# Patient Record
Sex: MALE | Race: WHITE | URBAN - METROPOLITAN AREA
[De-identification: names, ages, dates, MRNs, and addresses within clinical notes are randomized per-mention and may not be internally consistent; named-entity substitution may affect disease eponyms.]

---

## 2019-01-09 ENCOUNTER — HOSPITAL ENCOUNTER (OUTPATIENT)
Dept: MEDSURG UNIT | Facility: HOSPITAL | Age: 59
End: 2019-01-10
Attending: INTERNAL MEDICINE | Admitting: INTERNAL MEDICINE

## 2019-01-09 LAB
ABS NEUT (OLG): 6.99 X10(3)/MCL (ref 2.1–9.2)
ALBUMIN SERPL-MCNC: 4.4 GM/DL (ref 3.4–5)
ALBUMIN/GLOB SERPL: 1 RATIO (ref 1–2)
ALP SERPL-CCNC: 51 UNIT/L (ref 45–117)
ALT SERPL-CCNC: 48 UNIT/L (ref 12–78)
AMYLASE SERPL-CCNC: 57 UNIT/L (ref 25–115)
APTT PPP: 27.8 SECOND(S) (ref 23.3–37)
AST SERPL-CCNC: 71 UNIT/L (ref 15–37)
BASOPHILS # BLD AUTO: 0.04 X10(3)/MCL
BASOPHILS NFR BLD AUTO: 0 %
BILIRUB SERPL-MCNC: 0.6 MG/DL (ref 0.2–1)
BILIRUBIN DIRECT+TOT PNL SERPL-MCNC: 0.2 MG/DL
BILIRUBIN DIRECT+TOT PNL SERPL-MCNC: 0.4 MG/DL
BUN SERPL-MCNC: 45 MG/DL (ref 7–18)
C DIFF INTERP: NEGATIVE
CALCIUM SERPL-MCNC: 9.2 MG/DL (ref 8.5–10.1)
CHLORIDE SERPL-SCNC: 100 MMOL/L (ref 98–107)
CO2 SERPL-SCNC: 27 MMOL/L (ref 21–32)
CREAT SERPL-MCNC: 2.4 MG/DL (ref 0.6–1.3)
EOSINOPHIL # BLD AUTO: 0.02 10*3/UL
EOSINOPHIL NFR BLD AUTO: 0 %
ERYTHROCYTE [DISTWIDTH] IN BLOOD BY AUTOMATED COUNT: 12.7 % (ref 11.5–14.5)
GLOBULIN SER-MCNC: 3.6 GM/ML (ref 2.3–3.5)
GLUCOSE SERPL-MCNC: 145 MG/DL (ref 74–106)
HCT VFR BLD AUTO: 40.9 % (ref 40–51)
HCT VFR BLD AUTO: 44.6 % (ref 40–51)
HGB BLD-MCNC: 12.9 GM/DL (ref 13.5–17.5)
HGB BLD-MCNC: 14.2 GM/DL (ref 13.5–17.5)
IMM GRANULOCYTES # BLD AUTO: 0.02 10*3/UL
IMM GRANULOCYTES NFR BLD AUTO: 0 %
INR PPP: 1.18 (ref 0.9–1.2)
LIPASE SERPL-CCNC: 107 UNIT/L (ref 73–393)
LYMPHOCYTES # BLD AUTO: 0.71 X10(3)/MCL
LYMPHOCYTES NFR BLD AUTO: 8 % (ref 13–40)
MCH RBC QN AUTO: 30.4 PG (ref 26–34)
MCHC RBC AUTO-ENTMCNC: 31.8 GM/DL (ref 31–37)
MCV RBC AUTO: 95.5 FL (ref 80–100)
MONOCYTES # BLD AUTO: 0.58 X10(3)/MCL
MONOCYTES NFR BLD AUTO: 7 % (ref 4–12)
NEUTROPHILS # BLD AUTO: 6.99 X10(3)/MCL
NEUTROPHILS NFR BLD AUTO: 84 X10(3)/MCL
PLATELET # BLD AUTO: 269 X10(3)/MCL (ref 130–400)
PMV BLD AUTO: 9.4 FL (ref 7.4–10.4)
POTASSIUM SERPL-SCNC: 4.6 MMOL/L (ref 3.5–5.1)
PROT SERPL-MCNC: 8 GM/DL (ref 6.4–8.2)
PROTHROMBIN TIME: 14.9 SECOND(S) (ref 11.9–14.4)
RBC # BLD AUTO: 4.67 X10(6)/MCL (ref 4.5–5.9)
SODIUM SERPL-SCNC: 137 MMOL/L (ref 136–145)
WBC # SPEC AUTO: 8.4 X10(3)/MCL (ref 4.5–11)

## 2019-01-10 LAB
ABS NEUT (OLG): 3.95 X10(3)/MCL (ref 2.1–9.2)
BASOPHILS # BLD AUTO: 0.02 X10(3)/MCL
BASOPHILS NFR BLD AUTO: 0 %
BUN SERPL-MCNC: 26 MG/DL (ref 7–18)
CALCIUM SERPL-MCNC: 8.2 MG/DL (ref 8.5–10.1)
CHLORIDE SERPL-SCNC: 104 MMOL/L (ref 98–107)
CO2 SERPL-SCNC: 24 MMOL/L (ref 21–32)
CREAT SERPL-MCNC: 1.2 MG/DL (ref 0.6–1.3)
CREAT/UREA NIT SERPL: 22
EOSINOPHIL # BLD AUTO: 0.03 10*3/UL
EOSINOPHIL NFR BLD AUTO: 0 %
ERYTHROCYTE [DISTWIDTH] IN BLOOD BY AUTOMATED COUNT: 12.8 % (ref 11.5–14.5)
GLUCOSE SERPL-MCNC: 106 MG/DL (ref 74–106)
HCT VFR BLD AUTO: 40.8 % (ref 40–51)
HGB BLD-MCNC: 12.8 GM/DL (ref 13.5–17.5)
IMM GRANULOCYTES # BLD AUTO: 0.01 10*3/UL
IMM GRANULOCYTES NFR BLD AUTO: 0 %
LYMPHOCYTES # BLD AUTO: 0.92 X10(3)/MCL
LYMPHOCYTES NFR BLD AUTO: 17 % (ref 13–40)
MAGNESIUM SERPL-MCNC: 2 MG/DL (ref 1.8–2.4)
MCH RBC QN AUTO: 30 PG (ref 26–34)
MCHC RBC AUTO-ENTMCNC: 31.4 GM/DL (ref 31–37)
MCV RBC AUTO: 95.8 FL (ref 80–100)
MONOCYTES # BLD AUTO: 0.61 X10(3)/MCL
MONOCYTES NFR BLD AUTO: 11 % (ref 4–12)
NEUTROPHILS # BLD AUTO: 3.95 X10(3)/MCL
NEUTROPHILS NFR BLD AUTO: 71 X10(3)/MCL
PHOSPHATE SERPL-MCNC: 2 MG/DL (ref 2.5–4.9)
PLATELET # BLD AUTO: 230 X10(3)/MCL (ref 130–400)
PMV BLD AUTO: 9.5 FL (ref 7.4–10.4)
POTASSIUM SERPL-SCNC: 3.9 MMOL/L (ref 3.5–5.1)
RBC # BLD AUTO: 4.26 X10(6)/MCL (ref 4.5–5.9)
SODIUM SERPL-SCNC: 136 MMOL/L (ref 136–145)
WBC # SPEC AUTO: 5.5 X10(3)/MCL (ref 4.5–11)

## 2019-01-11 LAB — FINAL CULTURE: NORMAL

## 2022-04-10 ENCOUNTER — HISTORICAL (OUTPATIENT)
Dept: ADMINISTRATIVE | Facility: HOSPITAL | Age: 62
End: 2022-04-10

## 2022-04-29 VITALS
BODY MASS INDEX: 29.78 KG/M2 | DIASTOLIC BLOOD PRESSURE: 54 MMHG | SYSTOLIC BLOOD PRESSURE: 110 MMHG | OXYGEN SATURATION: 96 % | WEIGHT: 212.75 LBS | HEIGHT: 71 IN

## 2022-04-30 NOTE — ED PROVIDER NOTES
Patient:   Nimesh Naidu             MRN: [id number]            FIN: [id number]               Age:   58 years     Sex:  Male     :  1960   Associated Diagnoses:   Dehydration; STACIE (acute kidney injury)   Author:   Andi Dunn MD      Basic Information   Time seen: Date 2019, Immediately upon arrival.   History source: Patient.   Arrival mode: Private vehicle.   History limitation: None.   Additional information: Chief Complaint from Nursing Triage Note : Chief Complaint   2019 11:29 CST       Chief Complaint           pt here for n/v since 1am this morning  .      History of Present Illness   The patient presents with nausea, vomiting and abdominal cramping.  The onset was 1  days ago.  The course/duration of symptoms is worsening.  The exacerbating factor is eating.  The relieving factor is none.  Risk factors consist of diabetes mellitus and coronary artery disease.  Therapy today: see nurses notes.  Associated symptoms: abdominal pain, denies fever, denies chest pain and denies shortness of breath.  His wife had a stomach virus a week ago.  He also has a hx of Diverticulosis.        Review of Systems   Constitutional symptoms:  Weakness, no fever, no sweats.    Skin symptoms:  No jaundice, no rash, no pruritus, no abrasions.    Eye symptoms:  Vision unchanged, no pain, no discharge.    ENMT symptoms:  No ear pain, no sore throat, no nasal congestion.    Respiratory symptoms:  No shortness of breath, no hemoptysis.    Cardiovascular symptoms:  No chest pain, no palpitations, no tachycardia.    Gastrointestinal symptoms:  Abdominal pain, moderate, left lower quadrant, cramping, pain, nausea, vomiting.    Neurologic symptoms:  No headache, no dizziness, no altered level of consciousness.              Additional review of systems information: All other systems reviewed and otherwise negative.      Health Status   Allergies:    Allergic Reactions (Selected)  No Known Medication Allergies,     Allergies (1) Active Reaction  No Known Medication Allergies None Documented.   Medications:  (Selected)   Inpatient Medications  On Hold, Med Student  Normal Saline (0.9% NS) IV 1000 mL: 1,000 mL, 1,000 mL, IV, 1,000 mL/hr, start date 19 12:35:00 CST  Normal Saline (0.9% NS) IV 1000 mL: 1,000 mL, 1,000 mL, IV, 1,000 mL/hr, start date 19 13:50:00 CST  Zofran 2 mg/mL injectable solution: 4 mg, form: Injection, IV Push, Once PRN for nausea, first dose 19 14:15:00 CST, STAT  Documented Medications  Documented  CIPROFLOXACN TAB 500M mg = 1 tab(s), Oral, BID  Crestor 40 mg oral tablet: 40 mg = 1 tab(s), Oral, Daily, # 30 tab(s), 0 Refill(s)  Flonase 50 mcg/inh nasal spray: 1 spray(s), Nasal, Daily, # 16 gm, 0 Refill(s)  INVOKANA     TAB 100M mg = 1 tab(s), Oral, Daily  METFORMIN    TAB 1000M mg = 1 tab(s), Oral, BID  METRONIDAZOL TAB 500M mg = 1 tab(s), Oral, TID  Multivitamins and Minerals oral tablet: 0 Refill(s)  Norvasc 10 mg oral tablet: 10 mg = 1 tab(s), Oral, Daily, # 30 tab(s), 0 Refill(s)  PANTOPRAZOLE TAB 20M mg = 1 tab(s), Oral, Daily  Plavix 75 mg oral tablet: 75 mg = 1 tab(s), Oral, Daily, # 30 tab(s), 0 Refill(s)  Zetia 10 mg oral tablet: 10 mg = 1 tab(s), Oral, Daily, # 30 tab(s), 0 Refill(s)  aspirin 81 mg oral tablet: 81 mg = 1 tab(s), Oral, Daily, # 30 tab(s), 0 Refill(s)  bisoprolol-hydrochlorothiazide 10 mg-6.25 mg oral tablet: 1 tab(s), Oral, Daily, # 30 tab(s), 0 Refill(s)  fenofibrate 200 mg oral capsule: 200 mg = 1 cap(s), Oral, Daily, 0 Refill(s)  hydrochlorothiazide 25 mg oral tablet: 25 mg = 1 tab(s), Oral, Daily, # 30 tab(s), 0 Refill(s)  lisinopril 20 mg oral tablet: 20 mg = 1 tab(s), Oral, Daily, # 90 tab(s), 0 Refill(s)  methocarbamol 500 mg oral tablet: 1,000 mg = 2 tab(s), Oral, QID, 0 Refill(s)  sertraline 50 mg oral tablet: 50 mg = 1 tab(s), Oral, Daily, # 30 tab(s), 0 Refill(s), per nurse's notes.   Immunizations: Per nurse's notes.       Past Medical/ Family/ Social History   Medical history:    Active  GERD - Gastro-esophageal reflux disease (2624811684)  HTN - Hypertension ([id number])  Hyperlipidemia (70609582)  DM - Diabetes mellitus (054961786), Reviewed as documented in chart.   Surgical history:    CABG X4., Reviewed as documented in chart.   Family history:    No family history items have been selected or recorded., Reviewed as documented in chart.   Social history:    Social & Psychosocial Habits    Alcohol  09/06/2018  Use: Current    Frequency: 1-2 times per month    Home/Environment  09/06/2018  Lives with: Spouse    Living situation: Home/Independent    Substance Abuse  06/06/2015  Use: Never    Tobacco  02/06/2016  Use: Former smoker    Type: Cigarettes    Started at age: 18.0 Years    Stopped at age: 47 Years    12/11/2018  Use: Former smoker    Patient Wants Consult For Cessation Counseling N/A    01/09/2019  Use: Former smoker, quit more    Patient Wants Consult For Cessation Counseling No, Reviewed as documented in chart.   Problem list:    Active Problems (10)  CVA (cerebral vascular accident)   Diabetes   DM - Diabetes mellitus   GERD (gastroesophageal reflux disease)   GERD - Gastro-esophageal reflux disease   HTN - Hypertension   Hyperlipidemia   Hyperlipidemia   Hypertension   Lupus , per nurse's notes.      Physical Examination               Vital Signs             Time:  1/9/2019 14:58:00.   Vital Signs   1/9/2019 15:30 CST       Peripheral Pulse Rate     69 bpm                             Heart Rate Monitored      68 bpm                             Respiratory Rate          17 br/min                             SpO2                      95 %                             Saturation Probe Site     Hand, left                             Oxygen Therapy            Nasal cannula                             Oxygen Flow Rate          2 L/min                             Systolic Blood Pressure   127 mmHg                              Diastolic Blood Pressure  65 mmHg                             Mean Arterial Pressure, Cuff              86 mmHg                             Blood Pressure Location   Left arm    1/9/2019 15:00 CST       Temperature Oral          36.8 DegC                             Temperature Oral (calculated)             98.24 DegF                             Peripheral Pulse Rate     67 bpm                             Heart Rate Monitored      67 bpm                             Respiratory Rate          24 br/min                             SpO2                      98 %                             Saturation Probe Site     Hand, left                             Oxygen Therapy            Nasal cannula                             Oxygen Flow Rate          2 L/min                             Systolic Blood Pressure   119 mmHg                             Diastolic Blood Pressure  59 mmHg  LOW                             Mean Arterial Pressure, Cuff              79 mmHg                             Blood Pressure Location   Left arm    1/9/2019 14:30 CST       Peripheral Pulse Rate     67 bpm                             Heart Rate Monitored      67 bpm                             Respiratory Rate          16 br/min                             SpO2                      97 %                             Saturation Probe Site     Hand, left                             Oxygen Therapy            Nasal cannula                             Oxygen Flow Rate          2 L/min                             Systolic Blood Pressure   115 mmHg                             Diastolic Blood Pressure  57 mmHg  LOW                             Mean Arterial Pressure, Cuff              76 mmHg                             Blood Pressure Location   Left arm    1/9/2019 14:00 CST       Peripheral Pulse Rate     65 bpm                             Heart Rate Monitored      65 bpm                             Respiratory Rate          22 br/min                              SpO2                      94 %                             Saturation Probe Site     Hand, left                             Oxygen Therapy            Nasal cannula                             Oxygen Flow Rate          2 L/min                             Systolic Blood Pressure   107 mmHg                             Diastolic Blood Pressure  54 mmHg  LOW                             Mean Arterial Pressure, Cuff              72 mmHg                             Blood Pressure Location   Left arm    1/9/2019 13:40 CST       Peripheral Pulse Rate     64 bpm                             Heart Rate Monitored      65 bpm                             Respiratory Rate          22 br/min                             SpO2                      96 %                             Saturation Probe Site     Hand, left                             Oxygen Therapy            Nasal cannula                             Oxygen Flow Rate          2 L/min                             Systolic Blood Pressure   102 mmHg                             Diastolic Blood Pressure  56 mmHg  LOW                             Mean Arterial Pressure, Cuff              71 mmHg                             Blood Pressure Location   Left arm    1/9/2019 12:57 CST       SpO2                      92 %  LOW                             Oxygen Therapy            Nasal cannula                             Oxygen Flow Rate          2 L/min    1/9/2019 12:40 CST       Peripheral Pulse Rate     68 bpm                             Respiratory Rate          23 br/min                             SpO2                      95 %                             Saturation Probe Site     Hand, left                             Oxygen Therapy            Room air                             Systolic Blood Pressure   105 mmHg                             Diastolic Blood Pressure  54 mmHg  LOW                             Mean Arterial Pressure, Cuff              71 mmHg                              Blood Pressure Location   Left arm    1/9/2019 11:29 CST       Temperature Oral          36.7 DegC                             Temperature Oral (calculated)             98.06 DegF                             Peripheral Pulse Rate     70 bpm                             Respiratory Rate          20 br/min                             SpO2                      94 %                             Oxygen Therapy            Room air                             Systolic Blood Pressure   92 mmHg                             Diastolic Blood Pressure  58 mmHg  LOW  .      Vital Signs (last 24 hrs)_____  Last Charted___________  Temp Oral     36.8 DegC  (JAN 09 15:00)  Heart Rate Peripheral   69 bpm  (JAN 09 15:30)  Resp Rate         17 br/min  (JAN 09 15:30)  SBP      127 mmHg  (JAN 09 15:30)  DBP      65 mmHg  (JAN 09 15:30)  SpO2      95 %  (JAN 09 15:30).               Per nurse's notes.   Basic Oxygen Information   1/9/2019 15:30 CST       SpO2                      95 %                             Oxygen Therapy            Nasal cannula                             Oxygen Flow Rate          2 L/min    1/9/2019 15:00 CST       SpO2                      98 %                             Oxygen Therapy            Nasal cannula                             Oxygen Flow Rate          2 L/min    1/9/2019 14:30 CST       SpO2                      97 %                             Oxygen Therapy            Nasal cannula                             Oxygen Flow Rate          2 L/min    1/9/2019 14:00 CST       SpO2                      94 %                             Oxygen Therapy            Nasal cannula                             Oxygen Flow Rate          2 L/min    1/9/2019 13:40 CST       SpO2                      96 %                             Oxygen Therapy            Nasal cannula                             Oxygen Flow Rate          2 L/min    1/9/2019 12:57 CST       SpO2                      92 %  LOW                              Oxygen Therapy            Nasal cannula                             Oxygen Flow Rate          2 L/min    1/9/2019 12:40 CST       SpO2                      95 %                             Oxygen Therapy            Room air    1/9/2019 11:29 CST       SpO2                      94 %                             Oxygen Therapy            Room air  .   General:  Alert, no acute distress.    Skin:  Warm, dry, normal for ethnicity.    Head:  Normocephalic, atraumatic.    Neck:  Supple, trachea midline, no tenderness.    Eye:  Pupils are equal, round and reactive to light, extraocular movements are intact, normal conjunctiva, vision unchanged.    Ears, nose, mouth and throat:  Tympanic membranes clear, oral mucosa moist, no pharyngeal erythema or exudate.    Cardiovascular:  Regular rate and rhythm, Normal peripheral perfusion, No edema, 1st degree AV block.    Gastrointestinal:  Abdominal distention, Rebound, Tenderness: Moderate, left lower quadrant, Guarding: Minimal, Bowel sounds: Normal, Trauma: Negative.    Neurological:  Alert and oriented to person, place, time, and situation, No focal neurological deficit observed, CN II-XII intact, normal sensory observed, normal motor observed, normal speech observed, normal coordination observed.    Psychiatric:  Cooperative, appropriate mood & affect, normal judgment.       Medical Decision Making   Differential Diagnosis:  Nausea, vomiting, abdominal pain, diarrhea, gastritis, irritable bowel syndrome.    Documents reviewed:  Emergency department nurses' notes, prior records.    Electrocardiogram:  Time 1/9/2019 12:42:00, rate 66, normal sinus rhythm, no ectopy, EP Interp, sinus rythm with 1st degree AV block.    Results review:     Labs (Last four charted values)  WBC                  8.4 (JAN 09)   Hgb                  L 12.9 (JAN 09) 14.2 (JAN 09)   Hct                  40.9 (JAN 09) 44.6 (JAN 09)   Plt                  269 (JAN 09)   Na                   137 (JAN  09)   K                    4.6 (JAN 09)   CO2                  27 (JAN 09)   Cl                   100 (JAN 09)   Cr                   H 2.40 (JAN 09)   BUN                  H 45 (JAN 09)   Glucose Random       H 145 (JAN 09)   PT                   H 14.9 (JAN 09)   INR                  1.18 (JAN 09)   PTT                  27.8 (JAN 09) .   Radiology results:  Rad Results (ST)  < 12 hrs   Accession: [id number]  Order: CT Abdomen and Pelvis W/O Contrast  Report Dt/Tm: 01/09/2019 16:51  Report:   Clinical History:  Nausea and vomiting     Technique:  Multidetector non-contrast axial CT images of the abdomen and pelvis  were obtained with coronal and sagittal reconstructions.      Automatic exposure control was utilized to reduce the patient's  radiation dose.     Comparison:  May 26, 2018     Findings:     01. HEPATOBILIARY: 2 cm lesion within the right lower liver with  Hounsfield units most consistent with simple cyst. The gallbladder is  normal.      02. SPLEEN: Normal     03. PANCREAS: No focal masses or ductal dilatation.     04. ADRENALS: No adrenal nodules.     05. KIDNEYS: The right kidney demonstrates no stone, hydronephrosis,  or hydroureter. No focal mass identified.The left kidney demonstrates  no stone, hydronephrosis, or hydroureter. No focal mass identified.     06. LYMPHADENOPATHY/RETROPERITONEUM: There is no retroperitoneal  lymphadenopathy. The abdominal aorta is normal in course and caliber.      07. BOWEL: No acute bowel related abnormalities. No evidence of  appendiceal inflammation.     08. PELVIC VISCERA: Normal. No pelvic mass.     09. PELVIC LYMPH NODES: No lymphadenopathy.     10. PERITONEUM/ABDOMINAL WALL: Asymmetric peritoneal fat with the  predominance of small bowel in the right hemiabdomen which is  unchanged from prior.     11. SKELETAL: No aggressive appearing lytic/blastic lesion. No acute  fractures, subluxations or dislocations.     12. LUNG BASES: The visualized lungs are  unremarkable.     Impression  No acute abnormality identified within the abdomen and pelvis.  Asymmetric peritoneal fat with the predominance of small bowel in the  right hemiabdomen which is unchanged from prior.    .      Reexamination/ Reevaluation   Time: 1/9/2019 15:06:00 .   Vital signs   Basic Oxygen Information   1/9/2019 15:30 CST       SpO2                      95 %                             Oxygen Therapy            Nasal cannula                             Oxygen Flow Rate          2 L/min    1/9/2019 15:00 CST       SpO2                      98 %                             Oxygen Therapy            Nasal cannula                             Oxygen Flow Rate          2 L/min    1/9/2019 14:30 CST       SpO2                      97 %                             Oxygen Therapy            Nasal cannula                             Oxygen Flow Rate          2 L/min    1/9/2019 14:00 CST       SpO2                      94 %                             Oxygen Therapy            Nasal cannula                             Oxygen Flow Rate          2 L/min    1/9/2019 13:40 CST       SpO2                      96 %                             Oxygen Therapy            Nasal cannula                             Oxygen Flow Rate          2 L/min    1/9/2019 12:57 CST       SpO2                      92 %  LOW                             Oxygen Therapy            Nasal cannula                             Oxygen Flow Rate          2 L/min    1/9/2019 12:40 CST       SpO2                      95 %                             Oxygen Therapy            Room air    1/9/2019 11:29 CST       SpO2                      94 %                             Oxygen Therapy            Room air     Course: progressing as expected.   Pain status: increased.   Assessment: He is having increasing LLQ pain.  Had a BM in the ER with bright red blood.      Impression and Plan   Diagnosis   Dehydration (RCR37-QR E86.0)   STACIE (acute kidney  injury) ([id number] N17.9)      Calls-Consults   -  1/9/2019 14:25:00 , Dr Peña, consult.    Plan   Condition: Stable.    Disposition: Admit time  1/9/2019 14:59:00, Admit to Inpatient Unit, Dispositioned by: time: 1/9/2019 14:25:00.    Counseled: Patient, Regarding diagnosis, Regarding diagnostic results, Regarding treatment plan, Regarding prescription, Patient indicated understanding of instructions.    Notes: Seen and discussed with Ilene Eddy.

## 2022-05-03 NOTE — HISTORICAL [facility] CERNER
This is a historical note converted from [employer]. Formatting and pictures may have been removed.  Please reference [employer] for original formatting and attached multimedia. Chief Complaint  Nausea, Vomiting, Diarrhea, Abdominal Pain  History of Present Illness  59 yo M with h/o diverticulitis, multiple CVAs (residual L sided weakness), CAD s/p CABG x4, HTN, HLD, and DM2 presents to the ED with c/o nausea, vomiting, diarrhea, and abdominal pain that began around 1:00 AM. States he?had 2-3 episodes of emesis containing food and stomach acid?since that time. No hematemesis or coffee ground emesis. His nausea has not subsided. Also had several episodes of watery brown diarrhea; stool was non-bloody.?BMs were not painful but he does have constant abdominal pain. Described as crampy and mild, worse in the LLQ. Of note, he says that his wife (at bedside) had a stomach bug approx 1 week ago for which she was prescribed antibiotics by her PCP. Her symptoms resolved completely before this patients symptoms started. He denies other sick contacts, denies recent travel including international travel, denies eating any new or strange foods, and denies eating any raw or undercooked foods. When asked about recent antibiotic use, the pt states that he took some recently but does not know when. Review of his [facility] list shows cirpofloxacin, metronidazole, and amoxicillin all prescribed approx 1 month ago.  ?  In the ED, pt noted to have STACIE, Cr. 2.40, baseline 1.30. Given 2 L of NS. Medicine consulted for admission.  Just prior to our exam, pt had a profusely bloody episode of diarrhea. We ordered a stat CT abdomen with oral contrast and another 1L bolus of NS. Pt does report a h/o bleeding hemorrhoids  Review of Systems  Constitutional:?no fever, (+)fatigue, (+)weakness, no weight changes, no?loss of consciousness  Respiratory:?no cough, no wheezing, no shortness of breath  Cardiovascular:?no chest pain, no palpitations, no  edema  Gastrointestinal:?(+)abdominal pain, (+)nausea, (+)vomiting, (+)diarrhea with 1 episode of bloody diarrhea  Genitourinary:?no dysuria, no urinary frequency or urgency, no hematuria  Musculoskeletal:?no muscle or joint pain, no joint swelling  Integumentary:?no skin rash or abnormal lesion  Neurologic: no headache, no dizziness, (+) L sided weakness?from previous CVA  Physical Exam  Vitals & Measurements  T:?36.8? ?C (Oral)? TMIN:?36.7? ?C (Oral)? TMAX:?36.8? ?C (Oral)? HR:?69(Peripheral)? HR:?68(Monitored)? RR:?17? BP:?127/65? SpO2:?95%?  General:?AAO x 3, no acute distress, well appearing  Eye: PERRLA, EOMI, clear conjunctiva, eyelids normal  Respiratory:?clear to auscultation bilaterally, normal respiratory?rate and inspiratory effort  Cardiovascular:?RRR w/o murmurs, gallops or rubs, normal peripheral pulses, no LE edema  Gastrointestinal:?(+)distended, normal bowel sounds, soft, mildly tender in epigastrium and RLQ, without masses to palpation  Musculoskeletal:?full range of motion of all extremities/spine without limitation or discomfort  Integumentary: no rashes or skin lesions present  Neurologic: LUE and LLE strength 3/5, speech is clear, cognition in tact  Assessment/Plan  STACIE  - likely secondary to gastroenteritis with IVVD  - pt received 3 L NS in ED  - cont. IVFs at 125 mL/hr  - hold nephrotoxic agents  - repeat?BMP in AM  ?  Gastroenteritis  - stool sample collected, sent for C diff, culture, ova/parasites  - C diff negative  - conservative treatment: IVFs, nausea, pain control  ?  Hematochezia  - internal hemorrhoids vs. gastroenteritis  - repeat H&H in ED shows small drop, likely dilutional  - 2 large bore IVs in place, received 3 L IVFs so far  - Hold Plavix  - monitor with daily CBCs  ?  HTN  - home meds: HCTZ, lisinopril, amlodipine, bisoprolol-HCTZ  - holding HCTZ and lisinopril in setting of STACIE  - cont home amlodipine 10mg qd  - hydralazine?PRN  ?  DM2  - hold home meds while  hospitalized  - sliding scale with CBG checks AC + HS  ?  HLD  - cont Crestor 40mg qd  ?   DVT Proph: SCDs  GI Proph: Pantoprazole  Disposition: Admit for STACIE and gastroenteritis. IVFs and Abx. Recheck labs in AM. Possible D/C tomorrow if Cr normalizes   Problem List/Past Medical History  Ongoing  CVA (cerebral vascular accident)  Diabetes  DM - Diabetes mellitus  GERD (gastroesophageal reflux disease)  HTN - Hypertension  Hyperlipidemia  Hypertension  Lupus  Historical  No qualifying data  + Diverticulosis with diverticulitis  + Coronary artery disease s/p CABG  Procedure/Surgical History  CABG X4   Medications  Inpatient  No active inpatient medications  Home  aspirin 81 mg oral tablet, 81 mg= 1 tab(s), Oral, Daily  bisoprolol-hydrochlorothiazide 10 mg-6.25 mg oral tablet, 1 tab(s), Oral, Daily  CIPROFLOXACN TAB 500MG, 500 mg= 1 tab(s), Oral, BID  Crestor 40 mg oral tablet, 40 mg= 1 tab(s), Oral, Daily  fenofibrate 200 mg oral capsule, 200 mg= 1 cap(s), Oral, Daily  Flonase 50 mcg/inh nasal spray, 1 spray(s), Nasal, Daily  hydrochlorothiazide 25 mg oral tablet, 25 mg= 1 tab(s), Oral, Daily  INVOKANA TAB 100MG, 100 mg= 1 tab(s), Oral, Daily  lisinopril 20 mg oral tablet, 20 mg= 1 tab(s), Oral, Daily  METFORMIN TAB 1000MG, 1000 mg= 1 tab(s), Oral, BID  methocarbamol 500 mg oral tablet, 1000 mg= 2 tab(s), Oral, QID  METRONIDAZOL TAB 500MG, 500 mg= 1 tab(s), Oral, TID  Multivitamins and Minerals oral tablet  Norvasc 10 mg oral tablet, 10 mg= 1 tab(s), Oral, Daily  PANTOPRAZOLE TAB 20MG, 20 mg= 1 tab(s), Oral, Daily  Plavix 75 mg oral tablet, 75 mg= 1 tab(s), Oral, Daily  sertraline 50 mg oral tablet, 50 mg= 1 tab(s), Oral, Daily  Zetia 10 mg oral tablet, 10 mg= 1 tab(s), Oral, Daily  Allergies  No Known Medication Allergies  Social History  Alcohol  Current, 1-2 times per month, 09/06/2018  Home/Environment  Lives with Spouse. Living situation: Home/Independent., 09/06/2018  Substance Abuse  Never,  06/06/2015  Tobacco  Former smoker, Cigarettes, Started age 18.0 Years. Stopped age 47 Years., 02/06/2016  Family History  reviewed and non-contributory  Lab Results  Test Name Test Result Date/Time Comments   Sodium Lvl 137 mmol/L 01/09/2019 11:58 CST    Potassium Lvl 4.6 mmol/L 01/09/2019 11:58 CST    Chloride 100 mmol/L 01/09/2019 11:58 CST    CO2 27 mmol/L 01/09/2019 11:58 CST    Calcium Lvl 9.2 mg/dL 01/09/2019 11:58 CST    Glucose Lvl 145 mg/dL (High) 01/09/2019 11:58 CST    BUN 45 mg/dL (High) 01/09/2019 11:58 CST    Creatinine 2.40 mg/dL (High) 01/09/2019 11:58 CST    eGFR-ROSI 30 mL/min (Low) 01/09/2019 11:58 CST    Amylase Lvl 57 unit/L 01/09/2019 11:58 CST    Bili Total 0.6 mg/dL 01/09/2019 11:58 CST    Bili Direct 0.2 mg/dL 01/09/2019 11:58 CST    Bili Indirect 0.4 mg/dL 01/09/2019 11:58 CST    AST 71 unit/L (High) 01/09/2019 11:58 CST    ALT 48 unit/L 01/09/2019 11:58 CST    Alk Phos 51 unit/L 01/09/2019 11:58 CST    Albumin Lvl 4.4 gm/dL 01/09/2019 11:58 CST    Lipase Lvl 107 unit/L 01/09/2019 11:58 CST    PT 14.9 second(s) (High) 01/09/2019 11:58 CST Low intensity therapy  PT:?? 17.9-22.2 seconds  INR:? 1.5- 2.0  ?  Mod. intensity therapy  PT:?? 22.2- 30.2 SECONDS  INR:?? 2.0-3.0  ?  High intensity therapy  PT:?? 26.3 - 34 SEC  INR:?? 2.5-3.5   INR 1.18 01/09/2019 11:58 CST    PTT 27.8 second(s) 01/09/2019 11:58 CST Note: new reference range   WBC 8.4 x10(3)/mcL 01/09/2019 11:58 CST    Hgb 14.2 gm/dL 01/09/2019 11:58 CST    Hct 44.6 % 01/09/2019 11:58 CST    Platelet 269 x10(3)/mcL 01/09/2019 11:58 CST    C Diff Tox A/B Negative 01/09/2019 14:50 CST    C Diff GDH Ag Negative 01/09/2019 14:50 CST    C Diff Interp Negative UA 01/09/2019 14:50 CST    Diagnostic Results  CT Abdomen w/oral contrast 1/9:  No acute abnormality identified within the abdomen and pelvis.  Asymmetric peritoneal fat with the predominance of small bowel in the  right hemiabdomen which is unchanged from prior.      Patient seen and  examined with Dr. Burch. ?Essentially, this is a 58-year-old male with a past medical history of multiple CVAs, CAD status post CABG??4, hypertension, hyperlipidemia and diabetes mellitus to the ER today complaining of diarrhea, vomiting and nausea started around 1 AM. ?Had 2-3 episodes of emesis, but denied any hematemesis or coffee-ground emesis. ?States his nausea has not subsided. ?Also having several episodes of watery brown diarrhea that was nonbloody. ?His endorsing constant lower abdominal pain, crampy and mild nature worsening left lower quadrant. ?Wife states that she had a stomach bug about a week ago which she was prescribed antibiotics. ?Denies any other recent sick contact or travel history. ?In regards to recent antibiotic use, he states he recently took some, but doesnt know when.  ?  In the ER, patient was found to be slightly hypotensive on arrival with a BP of 92/58. ?Still nursing home crampy abdominal pain, and was reported by the nursing staff that did have one episode of bright red blood per rectum. ?He has some mild tenderness to palpation in the left lower quadrant area.  ?  Last performed in the ER, revealed a creatinine of 2.4, with patients baseline usually at 1.3. ?No white count, H/H at baseline, C. diff stool antigens were negative. ?CT of the abdomen and pelvis was also performed in the ER, which for any acute abnormalities.  ?  Assessment/plan:  AK I  Likely gastritis  Hypertension  DM  CAD  Hyperlipidemia  ?  At this point, the patient is satting 96% on 2 L nasal cannula?and?is protecting his airway. ?At this point, were beginning aggressive fluid resuscitation for patients AK I, or holding nephrotoxic agents at this time, including some of his home blood pressure medicines. ?Holding HCTZ and lisinopril. ?We will continue with his home Norvasc, and some when necessary hydralazine. ?Transition patient with an insulin sliding scale this time, and continue his Crestor as well. ?We  will recheck a BMP in the morning to assess his kidney function, this improved back to baseline we will likely discharge patient in the a.m.  ?   Patient seen and examined,?reviewed with the resident,?agree with?assessment?and?plan.? Patient feels markedly better and wants to go home?and feels like he can manage from here?on his own

## 2022-05-03 NOTE — HISTORICAL [facility]
This is a historical note converted from [employer]. Formatting and pictures may have been removed.  Please reference [employer] for original formatting and attached multimedia. ADMISSION DATE: 1/9/19  DISCHARGE DATE: 1/10/19  ATTENDING PHYSICIAN: Dr. Dell Myers  ?  PRIMARY CARE PHYSICIAN: Lesli Palacio?NP  REFERRING PHYSICIAN:  CONSULTING PHYSICIAN(S):  CONDITION ON DISCHARGE: Stable  ?  FINAL DIAGNOSIS:?Gastroenteritis - likely viral, acute kidney injury,?dehydration, hematochezia - likely internal hemorrhoids, hypertension, hyperlipidemia, diabetes mellitus type 2  PROCEDURES:  ?   HISTORY OF PRESENT ILLNESS:  57 yo M with h/o diverticulitis, multiple CVAs (residual L sided weakness), CAD s/p CABG x4, HTN, HLD, and DM2 presents to the ED with c/o nausea, vomiting, diarrhea, and abdominal pain that began around 1:00 AM. States he?had 2-3 episodes of emesis containing food and stomach acid?since that time. No hematemesis or coffee ground emesis. His nausea has not subsided. Also had several episodes of watery brown diarrhea; stool was non-bloody.?BMs were not painful but he does have constant abdominal pain. Described as crampy and mild, worse in the LLQ. Of note, he says that his wife (at bedside) had a stomach bug approx 1 week ago for which she was prescribed antibiotics by her PCP. Her symptoms resolved completely before this patients symptoms started. He denies other sick contacts, denies recent travel including international travel, denies eating any new or strange foods, and denies eating any raw or undercooked foods. When asked about recent antibiotic use, the pt states that he took some recently but does not know when. Review of his [facility] list shows cirpofloxacin, metronidazole, and amoxicillin all prescribed approx 1 month ago.  ?   In the ED, pt noted to have STACIE, Cr. 2.40, baseline 1.30. Given 2 L of NS. Medicine consulted for admission.  Just prior to our exam, pt had a profusely bloody episode of  diarrhea. We ordered a stat CT abdomen with oral contrast and another 1L bolus of NS. Pt does report a h/o bleeding hemorrhoids  ?  Physical Exam  General:?AAO x 3, no acute distress, well appearing  Eye: PERRLA, EOMI, clear conjunctiva, eyelids normal  Respiratory:?clear to auscultation bilaterally, normal respiratory?rate and inspiratory effort  Cardiovascular:?RRR w/o murmurs, gallops or rubs, normal peripheral pulses, no LE edema  Gastrointestinal:?(+)distended, normal bowel sounds, soft, mildly tender in epigastrium and RLQ, without masses to palpation  Musculoskeletal:?full range of motion of all extremities/spine without limitation or discomfort  Integumentary: no rashes or skin lesions present  Neurologic: LUE and LLE strength 3/5, speech is clear, cognition in tact  ?  LABORATORY/DATA:  Sodium 136  Potassium 3.9  Chloride 104  CO2 24  Calcium 8.2  Magnesium 2.0  Glucose 106  BUN 26  Creatinine 1.2  Phosphorus 2.0  WBC 5.5  Hgb 12.8  HCT 40.8  Platelet 230  Stool culture negative  Ova and parasite stool negative for  C. difficile stool negative  ?  HOSPITAL COURSE:  Mr. Naidu was admitted for?STACIE?secondary to?dehydration from?diarrheal illness.??We were also monitoring his hematochezia which did not occur again throughout his hospital stay.??Considering the severity of his symptoms,?the decision was made to start him empirically on antibiotics.? He was started on?ciprofloxacin and Flagyl.? He was also given?3 L of IV fluid in the ED, then normal saline was?continued at 125 cc/hour. ?The afternoon of hospital day 1 consisted of working to control the patients nausea.? He had several more bouts of diarrhea, again, none of them bloody.? We repeated an H & H?that afternoon,?which showed a stable hemoglobin of approximately 12.? On the morning of HD 2,?his hemoglobin was still 12.? Patient had reported that his diarrhea continued late into the evening but slowed down by?that morning.? He had no more nausea and  vomiting.? We will continue to monitor him throughout HD 2, until he was tolerating?a regular diet p.o.?without?nausea.? The patients creatinine had also normalized?from 2.40-1.20.? We will check orthostatic vital signs, which were normal.? Considering the patients STACIE?had resolved,?his diarrhea?was nearly resolved,?his volume status appears euvolemic, and he was tolerating a regular diet,?the decision was made to discharge the patient to home.? The patient agreed with this plan and expressed understanding of all the risks and benefits.? He is to stay well-hydrated?and eat a bland diet?for the next 2-3 days.? The patient is discharged to home in clinically and hemodynamically stable condition.  ?   He will be referred to?gastroenterology clinic to evaluate his hematochezia, which we suspect to be internal hemorrhoids.  ?   Per the patients request,?he was prescribed nitroglycerin 0.4 mg sublingual tabs?as he states the bottle he has at home is been  for several years.? The patient did not experience?any chest pain?throughout his hospital stay.  ?   DISCHARGE MEDICATIONS:  Simethicone 80 mg tablet 4 times a day as needed for gas  Nitroglycerin 0.4 mg sublingual every 5 minutes as needed for chest pain  Amlodipine 10 mg daily  ASA 81 mg daily  Bisoprolol-HCTZ 10 mg - 6.25 mg daily  Invokana 100 mg daily  Plavix 75 mg daily  Zetia 10 mg daily  Flonase 50 mcg/INH  HCTZ 25 mg daily  Lisinopril 20 mg daily  Methocarbamol 500 mg 4 times a day  Pantoprazole 20 mg daily  Crestor 40 mg daily  Sertraline 50 mg daily  Fenofibrate 200 mg daily  Metformin 1000 g twice daily  Pantoprazole 20 mg daily  Crestor 40 mg daily  Sertraline 50 mg daily  Fenofibrate 200 mg daily  Metformin 1000 g twice daily  ?  DISCHARGE INSTRUCTIONS:  DIET: Regular  ACTIVITY: As tolerated  DISPOSITION:?Discharge to home  FOLLOW UP APPOINTMENTS:  Follow-up with PCP (Lesli Palacio NP) within 1 month  Follow-up with GI clinic  ?  CODE STATUS: Full    Patient seen and examined,?reviewed with the resident,?agree with?assessment?and?plan.